# Patient Record
Sex: MALE | Race: WHITE | NOT HISPANIC OR LATINO
[De-identification: names, ages, dates, MRNs, and addresses within clinical notes are randomized per-mention and may not be internally consistent; named-entity substitution may affect disease eponyms.]

---

## 2017-04-10 ENCOUNTER — APPOINTMENT (OUTPATIENT)
Dept: SURGERY | Facility: CLINIC | Age: 72
End: 2017-04-10

## 2017-04-10 VITALS
BODY MASS INDEX: 26.37 KG/M2 | WEIGHT: 168 LBS | SYSTOLIC BLOOD PRESSURE: 142 MMHG | DIASTOLIC BLOOD PRESSURE: 76 MMHG | HEIGHT: 67 IN

## 2017-04-21 ENCOUNTER — OUTPATIENT (OUTPATIENT)
Dept: OUTPATIENT SERVICES | Facility: HOSPITAL | Age: 72
LOS: 1 days | End: 2017-04-21
Payer: MEDICARE

## 2017-04-21 VITALS
HEART RATE: 59 BPM | SYSTOLIC BLOOD PRESSURE: 160 MMHG | DIASTOLIC BLOOD PRESSURE: 92 MMHG | RESPIRATION RATE: 16 BRPM | TEMPERATURE: 98 F | HEIGHT: 67 IN | WEIGHT: 169.98 LBS

## 2017-04-21 DIAGNOSIS — K40.90 UNILATERAL INGUINAL HERNIA, WITHOUT OBSTRUCTION OR GANGRENE, NOT SPECIFIED AS RECURRENT: ICD-10-CM

## 2017-04-21 DIAGNOSIS — Z90.79 ACQUIRED ABSENCE OF OTHER GENITAL ORGAN(S): Chronic | ICD-10-CM

## 2017-04-21 DIAGNOSIS — Z98.890 OTHER SPECIFIED POSTPROCEDURAL STATES: Chronic | ICD-10-CM

## 2017-04-21 DIAGNOSIS — Z01.818 ENCOUNTER FOR OTHER PREPROCEDURAL EXAMINATION: ICD-10-CM

## 2017-04-21 DIAGNOSIS — I10 ESSENTIAL (PRIMARY) HYPERTENSION: ICD-10-CM

## 2017-04-21 PROCEDURE — G0463: CPT

## 2017-04-21 NOTE — H&P PST ADULT - PROBLEM SELECTOR PLAN 2
Instructed to take Cozaar and metoprolol with a sip of water the morning of surgery and follow up with PCP post surgery for management of hypertension and other comorbid conditions. Agrees with plan of care

## 2017-04-21 NOTE — H&P PST ADULT - NEGATIVE GENERAL SYMPTOMS
no anorexia/no weight loss/no fatigue/no chills/no fever/no malaise/no sweating/no polyphagia/no polyuria/no polydipsia/no weight gain

## 2017-04-21 NOTE — H&P PST ADULT - PMH
Enlarged prostate with lower urinary tract symptoms (LUTS)    Hypertension    Unilateral inguinal hernia without obstruction or gangrene, recurrence not specified

## 2017-04-21 NOTE — H&P PST ADULT - FAMILY HISTORY
Father  Still living? No  Family history of heart disease, Age at diagnosis: Age Unknown     Mother  Still living? No  Family history of thyroid cancer, Age at diagnosis: Age Unknown

## 2017-04-21 NOTE — H&P PST ADULT - PROBLEM SELECTOR PLAN 1
Scheduled for right inguinal hernia repair 4/28/2017. Preoperative instructions discussed and given to patient. Verbalized understanding of same

## 2017-04-21 NOTE — H&P PST ADULT - RS GEN PE MLT RESP DETAILS PC
airway patent/no rales/no rhonchi/clear to auscultation bilaterally/no intercostal retractions/no wheezes/no chest wall tenderness/no subcutaneous emphysema/breath sounds equal/normal/respirations non-labored

## 2017-04-21 NOTE — H&P PST ADULT - NEGATIVE CARDIOVASCULAR SYMPTOMS
no paroxysmal nocturnal dyspnea/no chest pain/no orthopnea/no peripheral edema/no claudication/no dyspnea on exertion/no palpitations

## 2017-04-21 NOTE — H&P PST ADULT - HISTORY OF PRESENT ILLNESS
72 y/o male with PMH of hypertension, enlarged prostate s/p TURP 11/2016, is here today for presurgical evaluation prior to surgery. Complains of mass at right inguinal region for the past 2 months. He was diagnosed with unilateral inguinal hernia without obstruction or gangrene and is scheduled for right inguinal hernia repair 4/28/2017

## 2017-04-21 NOTE — H&P PST ADULT - ASSESSMENT
72 y/o male with PMH of hypertension, enlarged prostate s/p TURP 11/2016, diagnosed with unilateral inguinal hernia without obstruction or gangrene and scheduled for right inguinal hernia repair 4/28/2017. Patient is at low risk for planned procedure

## 2017-04-21 NOTE — H&P PST ADULT - NEGATIVE GASTROINTESTINAL SYMPTOMS
no melena/no change in bowel habits/no nausea/no constipation/no diarrhea/no hematochezia/no steatorrhea/no hiccoughs/no flatulence/no abdominal pain/no vomiting/no jaundice

## 2017-04-28 ENCOUNTER — OUTPATIENT (OUTPATIENT)
Dept: OUTPATIENT SERVICES | Facility: HOSPITAL | Age: 72
LOS: 1 days | Discharge: HOME | End: 2017-04-28
Payer: MEDICARE

## 2017-04-28 VITALS
RESPIRATION RATE: 14 BRPM | DIASTOLIC BLOOD PRESSURE: 90 MMHG | OXYGEN SATURATION: 100 % | HEIGHT: 67 IN | HEART RATE: 71 BPM | SYSTOLIC BLOOD PRESSURE: 149 MMHG | WEIGHT: 169.98 LBS | TEMPERATURE: 98 F

## 2017-04-28 VITALS
SYSTOLIC BLOOD PRESSURE: 160 MMHG | OXYGEN SATURATION: 100 % | HEART RATE: 56 BPM | RESPIRATION RATE: 15 BRPM | TEMPERATURE: 98 F | DIASTOLIC BLOOD PRESSURE: 84 MMHG

## 2017-04-28 DIAGNOSIS — Z90.79 ACQUIRED ABSENCE OF OTHER GENITAL ORGAN(S): Chronic | ICD-10-CM

## 2017-04-28 DIAGNOSIS — Z98.890 OTHER SPECIFIED POSTPROCEDURAL STATES: Chronic | ICD-10-CM

## 2017-04-28 DIAGNOSIS — K40.90 UNILATERAL INGUINAL HERNIA, WITHOUT OBSTRUCTION OR GANGRENE, NOT SPECIFIED AS RECURRENT: ICD-10-CM

## 2017-04-28 PROCEDURE — 49505 PRP I/HERN INIT REDUC >5 YR: CPT | Mod: AS,RT

## 2017-04-28 PROCEDURE — 49505 PRP I/HERN INIT REDUC >5 YR: CPT | Mod: RT

## 2017-04-28 PROCEDURE — C1781: CPT

## 2017-04-28 RX ORDER — OXYCODONE HYDROCHLORIDE 5 MG/1
1 TABLET ORAL
Qty: 16 | Refills: 0 | OUTPATIENT
Start: 2017-04-28 | End: 2017-05-02

## 2017-04-28 RX ORDER — LOSARTAN POTASSIUM 100 MG/1
1 TABLET, FILM COATED ORAL
Qty: 0 | Refills: 0 | COMMUNITY

## 2017-04-28 RX ORDER — SODIUM CHLORIDE 9 MG/ML
3 INJECTION INTRAMUSCULAR; INTRAVENOUS; SUBCUTANEOUS EVERY 8 HOURS
Qty: 0 | Refills: 0 | Status: DISCONTINUED | OUTPATIENT
Start: 2017-04-28 | End: 2017-04-28

## 2017-04-28 RX ORDER — METOPROLOL TARTRATE 50 MG
1 TABLET ORAL
Qty: 0 | Refills: 0 | COMMUNITY

## 2017-04-28 RX ORDER — ONDANSETRON 8 MG/1
4 TABLET, FILM COATED ORAL ONCE
Qty: 0 | Refills: 0 | Status: DISCONTINUED | OUTPATIENT
Start: 2017-04-28 | End: 2017-05-01

## 2017-04-28 RX ORDER — HYDROMORPHONE HYDROCHLORIDE 2 MG/ML
0.5 INJECTION INTRAMUSCULAR; INTRAVENOUS; SUBCUTANEOUS
Qty: 0 | Refills: 0 | Status: DISCONTINUED | OUTPATIENT
Start: 2017-04-28 | End: 2017-05-01

## 2017-04-28 RX ORDER — SODIUM CHLORIDE 9 MG/ML
1000 INJECTION, SOLUTION INTRAVENOUS
Qty: 0 | Refills: 0 | Status: DISCONTINUED | OUTPATIENT
Start: 2017-04-28 | End: 2017-05-06

## 2017-04-28 RX ADMIN — SODIUM CHLORIDE 3 MILLILITER(S): 9 INJECTION INTRAMUSCULAR; INTRAVENOUS; SUBCUTANEOUS at 13:17

## 2017-04-28 NOTE — ASU DISCHARGE PLAN (ADULT/PEDIATRIC). - MEDICATION SUMMARY - MEDICATIONS TO TAKE
I will START or STAY ON the medications listed below when I get home from the hospital:    flaxseed oil  -- 1 tab(s) by mouth once a day in PM. last dose on 11/23/2016  -- Indication: For as directed     Aspirin Low Dose 81 mg oral delayed release tablet  -- 1 tab(s) by mouth once a day. last dose on 11/23/2016  -- Indication: For as directed     acetaminophen-oxycodone 325 mg-5 mg oral tablet  -- 1 tab(s) by mouth every 6 hours, As needed, Moderate Pain (4 - 6) MDD:4  -- Indication: For pain     Cozaar 25 mg oral tablet  -- 1 tab(s) by mouth once a day  -- Indication: For as directed     metoprolol succinate 50 mg oral tablet, extended release  -- 1 tab(s) by mouth 2 times a day  -- Indication: For as directed     Vitamin A, D oral capsule  -- 1 cap(s) by mouth once a day in AM  -- Indication: For as directed     Vitamin B6  -- 1 tab(s) by mouth once a day in PM  -- Indication: For as directed     Vitamin C 1000 mg oral tablet  -- 1 tab(s) by mouth once a day in AM  -- Indication: For as directed

## 2017-05-01 ENCOUNTER — TRANSCRIPTION ENCOUNTER (OUTPATIENT)
Age: 72
End: 2017-05-01

## 2017-05-05 DIAGNOSIS — I10 ESSENTIAL (PRIMARY) HYPERTENSION: ICD-10-CM

## 2017-05-05 DIAGNOSIS — Z79.82 LONG TERM (CURRENT) USE OF ASPIRIN: ICD-10-CM

## 2017-05-05 DIAGNOSIS — K40.90 UNILATERAL INGUINAL HERNIA, WITHOUT OBSTRUCTION OR GANGRENE, NOT SPECIFIED AS RECURRENT: ICD-10-CM

## 2017-05-11 ENCOUNTER — APPOINTMENT (OUTPATIENT)
Dept: SURGERY | Facility: CLINIC | Age: 72
End: 2017-05-11

## 2017-05-11 VITALS
HEIGHT: 67 IN | HEART RATE: 65 BPM | OXYGEN SATURATION: 97 % | BODY MASS INDEX: 25.27 KG/M2 | WEIGHT: 161 LBS | SYSTOLIC BLOOD PRESSURE: 130 MMHG | TEMPERATURE: 98.1 F | DIASTOLIC BLOOD PRESSURE: 78 MMHG

## 2023-01-27 NOTE — ASU PREOP CHECKLIST - LAST DOSE WITHIN LAST 24HRS
Final Anesthesia Post-op Assessment    Patient: Mana Antonio  Procedure(s) Performed: REMOVAL, HARDWARE, ANKLE; APPLICATION OF ANTIBIOTIC BEADS AND GRAFT JACKET - LEFTDEBRIDEMENT ULCER LEFT FOOT - LEFTAPPLICATION, WOUND VAC - LEFT  Anesthesia type: General    Vitals Value Taken Time   Temp 36.0 01/26/23 2141   Pulse 54 01/26/23 2134   Resp 16 01/26/23 2134   SpO2 94 % 01/26/23 2134   /98 01/26/23 2110         Patient Location: Phase II  Post-op Vital Signs:stable  Level of Consciousness: awake and alert  Respiratory Status: spontaneous ventilation  Cardiovascular stable  Hydration: euvolemic  Pain Management: adequately controlled  Vomiting: none  Nausea: None  Airway Patency:patent  Post-op Assessment: awake, alert, appropriately conversant, or baseline, no complications, patient tolerated procedure well with no complications, no evidence of recall and dentition within defined limits      No notable events documented.    Yes

## 2025-08-14 ENCOUNTER — NON-APPOINTMENT (OUTPATIENT)
Age: 80
End: 2025-08-14

## 2025-08-14 ENCOUNTER — APPOINTMENT (OUTPATIENT)
Dept: OPHTHALMOLOGY | Facility: CLINIC | Age: 80
End: 2025-08-14

## 2025-08-14 PROCEDURE — 92004 COMPRE OPH EXAM NEW PT 1/>: CPT

## 2025-08-14 PROCEDURE — 92134 CPTRZ OPH DX IMG PST SGM RTA: CPT
